# Patient Record
Sex: FEMALE | Race: BLACK OR AFRICAN AMERICAN | Employment: FULL TIME | URBAN - METROPOLITAN AREA
[De-identification: names, ages, dates, MRNs, and addresses within clinical notes are randomized per-mention and may not be internally consistent; named-entity substitution may affect disease eponyms.]

---

## 2017-10-04 ENCOUNTER — OP HISTORICAL/CONVERTED ENCOUNTER (OUTPATIENT)
Dept: OTHER | Age: 27
End: 2017-10-04

## 2018-03-30 ENCOUNTER — OP HISTORICAL/CONVERTED ENCOUNTER (OUTPATIENT)
Dept: OTHER | Age: 28
End: 2018-03-30

## 2019-08-25 ENCOUNTER — ED HISTORICAL/CONVERTED ENCOUNTER (OUTPATIENT)
Dept: OTHER | Age: 29
End: 2019-08-25

## 2019-08-26 ENCOUNTER — ED HISTORICAL/CONVERTED ENCOUNTER (OUTPATIENT)
Dept: OTHER | Age: 29
End: 2019-08-26

## 2021-12-29 ENCOUNTER — HOSPITAL ENCOUNTER (EMERGENCY)
Age: 31
Discharge: HOME | End: 2021-12-29
Attending: STUDENT IN AN ORGANIZED HEALTH CARE EDUCATION/TRAINING PROGRAM
Payer: COMMERCIAL

## 2021-12-29 ENCOUNTER — APPOINTMENT (OUTPATIENT)
Dept: ULTRASOUND IMAGING | Age: 31
End: 2021-12-29
Attending: STUDENT IN AN ORGANIZED HEALTH CARE EDUCATION/TRAINING PROGRAM
Payer: COMMERCIAL

## 2021-12-29 ENCOUNTER — APPOINTMENT (OUTPATIENT)
Dept: CT IMAGING | Age: 31
End: 2021-12-29
Attending: STUDENT IN AN ORGANIZED HEALTH CARE EDUCATION/TRAINING PROGRAM
Payer: COMMERCIAL

## 2021-12-29 VITALS
HEART RATE: 100 BPM | BODY MASS INDEX: 43.32 KG/M2 | WEIGHT: 260 LBS | TEMPERATURE: 99.6 F | RESPIRATION RATE: 17 BRPM | HEIGHT: 65 IN | OXYGEN SATURATION: 99 % | DIASTOLIC BLOOD PRESSURE: 80 MMHG | SYSTOLIC BLOOD PRESSURE: 130 MMHG

## 2021-12-29 DIAGNOSIS — K52.9 NONINFECTIOUS GASTROENTERITIS, UNSPECIFIED TYPE: Primary | ICD-10-CM

## 2021-12-29 LAB
ALBUMIN SERPL-MCNC: 3 G/DL (ref 3.5–5)
ALBUMIN/GLOB SERPL: 0.7 {RATIO} (ref 1.1–2.2)
ALP SERPL-CCNC: 57 U/L (ref 45–117)
ALT SERPL-CCNC: 22 U/L (ref 12–78)
ANION GAP SERPL CALC-SCNC: 10 MMOL/L (ref 5–15)
APPEARANCE UR: ABNORMAL
AST SERPL W P-5'-P-CCNC: 20 U/L (ref 15–37)
BACTERIA URNS QL MICRO: ABNORMAL /HPF
BASOPHILS # BLD: 0 K/UL (ref 0–0.1)
BASOPHILS NFR BLD: 0 % (ref 0–1)
BILIRUB SERPL-MCNC: 0.3 MG/DL (ref 0.2–1)
BILIRUB UR QL: NEGATIVE
BUN SERPL-MCNC: 7 MG/DL (ref 6–20)
BUN/CREAT SERPL: 12 (ref 12–20)
CA-I BLD-MCNC: 8.3 MG/DL (ref 8.5–10.1)
CHLORIDE SERPL-SCNC: 102 MMOL/L (ref 97–108)
CO2 SERPL-SCNC: 25 MMOL/L (ref 21–32)
COLOR UR: YELLOW
CREAT SERPL-MCNC: 0.6 MG/DL (ref 0.55–1.02)
DIFFERENTIAL METHOD BLD: ABNORMAL
EOSINOPHIL # BLD: 0 K/UL (ref 0–0.4)
EOSINOPHIL NFR BLD: 0 % (ref 0–7)
EPITH CASTS URNS QL MICRO: ABNORMAL /LPF
ERYTHROCYTE [DISTWIDTH] IN BLOOD BY AUTOMATED COUNT: 17.1 % (ref 11.5–14.5)
GLOBULIN SER CALC-MCNC: 4.5 G/DL (ref 2–4)
GLUCOSE SERPL-MCNC: 92 MG/DL (ref 65–100)
GLUCOSE UR STRIP.AUTO-MCNC: NEGATIVE MG/DL
HCT VFR BLD AUTO: 32.1 % (ref 35–47)
HGB BLD-MCNC: 9.7 G/DL (ref 11.5–16)
HGB UR QL STRIP: ABNORMAL
IMM GRANULOCYTES # BLD AUTO: 0 K/UL (ref 0–0.04)
IMM GRANULOCYTES NFR BLD AUTO: 0 % (ref 0–0.5)
KETONES UR QL STRIP.AUTO: 15 MG/DL
LEUKOCYTE ESTERASE UR QL STRIP.AUTO: ABNORMAL
LIPASE SERPL-CCNC: 52 U/L (ref 73–393)
LYMPHOCYTES # BLD: 1.1 K/UL (ref 0.8–3.5)
LYMPHOCYTES NFR BLD: 14 % (ref 12–49)
MCH RBC QN AUTO: 25.5 PG (ref 26–34)
MCHC RBC AUTO-ENTMCNC: 30.2 G/DL (ref 30–36.5)
MCV RBC AUTO: 84.3 FL (ref 80–99)
MONOCYTES # BLD: 0.6 K/UL (ref 0–1)
MONOCYTES NFR BLD: 8 % (ref 5–13)
MUCOUS THREADS URNS QL MICRO: ABNORMAL /LPF
NEUTS SEG # BLD: 6.3 K/UL (ref 1.8–8)
NEUTS SEG NFR BLD: 78 % (ref 32–75)
NITRITE UR QL STRIP.AUTO: NEGATIVE
PH UR STRIP: 6 [PH] (ref 5–8)
PLATELET # BLD AUTO: 392 K/UL (ref 150–400)
PMV BLD AUTO: 9.3 FL (ref 8.9–12.9)
POTASSIUM SERPL-SCNC: 2.9 MMOL/L (ref 3.5–5.1)
POTASSIUM SERPL-SCNC: 3.6 MMOL/L (ref 3.5–5.1)
PROT SERPL-MCNC: 7.5 G/DL (ref 6.4–8.2)
PROT UR STRIP-MCNC: 30 MG/DL
RBC # BLD AUTO: 3.81 M/UL (ref 3.8–5.2)
RBC #/AREA URNS HPF: ABNORMAL /HPF (ref 0–5)
SODIUM SERPL-SCNC: 137 MMOL/L (ref 136–145)
SP GR UR REFRACTOMETRY: 1.02 (ref 1–1.03)
UROBILINOGEN UR QL STRIP.AUTO: 0.1 EU/DL (ref 0.2–1)
WBC # BLD AUTO: 8 K/UL (ref 3.6–11)
WBC URNS QL MICRO: ABNORMAL /HPF (ref 0–4)

## 2021-12-29 PROCEDURE — 74011250637 HC RX REV CODE- 250/637: Performed by: STUDENT IN AN ORGANIZED HEALTH CARE EDUCATION/TRAINING PROGRAM

## 2021-12-29 PROCEDURE — 96375 TX/PRO/DX INJ NEW DRUG ADDON: CPT

## 2021-12-29 PROCEDURE — 74011250636 HC RX REV CODE- 250/636: Performed by: STUDENT IN AN ORGANIZED HEALTH CARE EDUCATION/TRAINING PROGRAM

## 2021-12-29 PROCEDURE — 80053 COMPREHEN METABOLIC PANEL: CPT

## 2021-12-29 PROCEDURE — 96361 HYDRATE IV INFUSION ADD-ON: CPT

## 2021-12-29 PROCEDURE — 76705 ECHO EXAM OF ABDOMEN: CPT

## 2021-12-29 PROCEDURE — 74176 CT ABD & PELVIS W/O CONTRAST: CPT

## 2021-12-29 PROCEDURE — 84132 ASSAY OF SERUM POTASSIUM: CPT

## 2021-12-29 PROCEDURE — 83690 ASSAY OF LIPASE: CPT

## 2021-12-29 PROCEDURE — 96365 THER/PROPH/DIAG IV INF INIT: CPT

## 2021-12-29 PROCEDURE — 81001 URINALYSIS AUTO W/SCOPE: CPT

## 2021-12-29 PROCEDURE — 85025 COMPLETE CBC W/AUTO DIFF WBC: CPT

## 2021-12-29 PROCEDURE — 99283 EMERGENCY DEPT VISIT LOW MDM: CPT

## 2021-12-29 RX ORDER — METHOTREXATE 2.5 MG/1
TABLET ORAL
COMMUNITY
Start: 2021-08-04

## 2021-12-29 RX ORDER — POTASSIUM CHLORIDE 1.5 G/1.77G
40 POWDER, FOR SOLUTION ORAL ONCE
Status: COMPLETED | OUTPATIENT
Start: 2021-12-29 | End: 2021-12-29

## 2021-12-29 RX ORDER — DIPHENHYDRAMINE HYDROCHLORIDE 50 MG/ML
50 INJECTION, SOLUTION INTRAMUSCULAR; INTRAVENOUS
Status: COMPLETED | OUTPATIENT
Start: 2021-12-29 | End: 2021-12-29

## 2021-12-29 RX ORDER — HYDROXYCHLOROQUINE SULFATE 200 MG/1
TABLET, FILM COATED ORAL
COMMUNITY
Start: 2021-08-04

## 2021-12-29 RX ORDER — SODIUM CHLORIDE 9 MG/ML
1000 INJECTION, SOLUTION INTRAVENOUS ONCE
Status: COMPLETED | OUTPATIENT
Start: 2021-12-29 | End: 2021-12-29

## 2021-12-29 RX ORDER — DICYCLOMINE HYDROCHLORIDE 10 MG/1
10 CAPSULE ORAL 4 TIMES DAILY
Qty: 20 CAPSULE | Refills: 0 | Status: SHIPPED | OUTPATIENT
Start: 2021-12-29

## 2021-12-29 RX ORDER — SERTRALINE HYDROCHLORIDE 100 MG/1
TABLET, FILM COATED ORAL
COMMUNITY

## 2021-12-29 RX ORDER — POTASSIUM CHLORIDE 7.45 MG/ML
10 INJECTION INTRAVENOUS ONCE
Status: COMPLETED | OUTPATIENT
Start: 2021-12-29 | End: 2021-12-29

## 2021-12-29 RX ORDER — CHOLECALCIFEROL (VITAMIN D3) 125 MCG
CAPSULE ORAL
COMMUNITY
Start: 2021-08-04

## 2021-12-29 RX ADMIN — DIPHENHYDRAMINE HYDROCHLORIDE 50 MG: 50 INJECTION INTRAMUSCULAR; INTRAVENOUS at 12:15

## 2021-12-29 RX ADMIN — POTASSIUM CHLORIDE 40 MEQ: 1.5 FOR SOLUTION ORAL at 12:31

## 2021-12-29 RX ADMIN — METHYLPREDNISOLONE SODIUM SUCCINATE 125 MG: 125 INJECTION, POWDER, FOR SOLUTION INTRAMUSCULAR; INTRAVENOUS at 12:17

## 2021-12-29 RX ADMIN — SODIUM CHLORIDE 1000 ML: 9 INJECTION, SOLUTION INTRAVENOUS at 10:36

## 2021-12-29 RX ADMIN — FAMOTIDINE 20 MG: 10 INJECTION INTRAVENOUS at 12:18

## 2021-12-29 RX ADMIN — POTASSIUM CHLORIDE 10 MEQ: 7.46 INJECTION, SOLUTION INTRAVENOUS at 12:22

## 2021-12-29 NOTE — LETTER
NOTIFICATION RETURN TO WORK / SCHOOL    12/29/2021 2:47 PM    [phone]      To Whom It May Concern:    Harinder Angel is currently under the care of [address] [place] [address]. She will return to work/school on: 01/01/2022  Harinder Angel may return to work/school with the following restrictions: None    If there are questions or concerns please have the patient contact our office.         Sincerely,      Radha Bower RN

## 2021-12-29 NOTE — ED PROVIDER NOTES
EMERGENCY DEPARTMENT HISTORY AND PHYSICAL EXAM      Date: 12/29/2021  Patient Name: Leatha Allen      History of Presenting Illness     Chief Complaint   Patient presents with    Headache    Abdominal Pain    Diarrhea       History Provided By: Patient    HPI: Leatha Allen, 33 y.o. female with PMH of autoimmune disease and pancreatitis who presents to the ED with abdominal pain. Abdominal pain is in the epigastrium and right upper quadrant, nothing specific makes it better or worse associated with diarrhea. Patient reports he has been having bowel movements for the last 3 days. She is also reporting fever. She called her primary care doctor earlier in the week and got a Covid test which was negative. Of note, patient is vaccinated. There are no other complaints, changes, or physical findings at this time. PCP: Cynthia Rodriguez MD    Current Outpatient Medications   Medication Sig Dispense Refill    cholecalciferol, vitamin D3, 50 mcg (2,000 unit) tab 1 tablet      methotrexate (RHEUMATREX) 2.5 mg tablet methotrexate sodium 2.5 mg tablet   6 tabs once a week      hydrOXYchloroQUINE (PLAQUENIL) 200 mg tablet hydroxychloroquine 200 mg tablet   take 1 tablet by mouth twice a day      dicyclomine (BENTYL) 10 mg capsule Take 1 Capsule by mouth four (4) times daily. 20 Capsule 0    sertraline (ZOLOFT) 100 mg tablet sertraline 100 mg tablet   Take 1 tablet every day by oral route. Past History     Past Medical History:  Past Medical History:   Diagnosis Date    Lupus ([facility] [address] [facility]     Pancreatitis     Sjogren's disease ([facility] [address] [facility]        Past Surgical History:  History reviewed. No pertinent surgical history. Family History:  History reviewed. No pertinent family history. Social History:  Social History     Tobacco Use    Smoking status: Never Smoker    Smokeless tobacco: Never Used   Substance Use Topics    Alcohol use: Never    Drug use: Never       Allergies:   Allergies   Allergen Reactions    Iodine Anaphylaxis    Nut. Therap. Glutaric Aciduria 1 Anaphylaxis         Review of Systems     Review of Systems   Constitutional: Negative for appetite change, chills and fever. HENT: Negative for rhinorrhea and trouble swallowing. Eyes: Negative for photophobia and visual disturbance. Respiratory: Negative for cough and shortness of breath. Cardiovascular: Negative for chest pain and leg swelling. Gastrointestinal: Positive for abdominal pain and diarrhea. Negative for constipation, nausea and vomiting. Genitourinary: Negative for dysuria and flank pain. Musculoskeletal: Negative for back pain and gait problem. Skin: Negative for color change and rash. Neurological: Negative for dizziness, weakness and headaches. Physical Exam     Physical Exam  Constitutional:       Appearance: Normal appearance. She is not ill-appearing. HENT:      Head: Normocephalic and atraumatic. Eyes:      Extraocular Movements: Extraocular movements intact. Conjunctiva/sclera: Conjunctivae normal.   Cardiovascular:      Rate and Rhythm: Normal rate and regular rhythm. Pulmonary:      Effort: Pulmonary effort is normal.      Breath sounds: Normal breath sounds. Abdominal:      General: Abdomen is flat. Palpations: Abdomen is soft. Tenderness: There is abdominal tenderness in the right upper quadrant and epigastric area. Musculoskeletal:         General: No tenderness or deformity. Cervical back: Normal range of motion and neck supple. Neurological:      General: No focal deficit present. Mental Status: She is alert and oriented to person, place, and time.          Lab and Diagnostic Study Results     Labs -     Recent Results (from the past 12 hour(s))   URINALYSIS W/ RFLX MICROSCOPIC    Collection Time: 12/29/21  9:00 AM   Result Value Ref Range    Color Yellow      Appearance Hazy (A) Clear      Specific gravity 1.020 1.003 - 1.030      pH (UA) 6.0 5.0 - 8.0      Protein 30 (A) Negative mg/dL    Glucose Negative Negative mg/dL    Ketone 15 (A) Negative mg/dL    Bilirubin Negative Negative      Blood Large (A) Negative      Urobilinogen 0.1 (L) 0.2 - 1.0 EU/dL    Nitrites Negative Negative      Leukocyte Esterase Trace (A) Negative     URINE MICROSCOPIC    Collection Time: 12/29/21  9:00 AM   Result Value Ref Range    WBC 0-4 0 - 4 /hpf    RBC 5-10 0 - 5 /hpf    Epithelial cells Many (A) Few /lpf    Bacteria 1+ (A) Negative /hpf    Mucus 4+ (A) Negative /lpf   CBC WITH AUTOMATED DIFF    Collection Time: 12/29/21 10:09 AM   Result Value Ref Range    WBC 8.0 3.6 - 11.0 K/uL    RBC 3.81 3.80 - 5.20 M/uL    HGB 9.7 (L) 11.5 - 16.0 g/dL    HCT 32.1 (L) 35.0 - 47.0 %    MCV 84.3 80.0 - 99.0 FL    MCH 25.5 (L) 26.0 - 34.0 PG    MCHC 30.2 30.0 - 36.5 g/dL    RDW 17.1 (H) 11.5 - 14.5 %    PLATELET 478 846 - 069 K/uL    MPV 9.3 8.9 - 12.9 FL    NEUTROPHILS 78 (H) 32 - 75 %    LYMPHOCYTES 14 12 - 49 %    MONOCYTES 8 5 - 13 %    EOSINOPHILS 0 0 - 7 %    BASOPHILS 0 0 - 1 %    IMMATURE GRANULOCYTES 0 0.0 - 0.5 %    ABS. NEUTROPHILS 6.3 1.8 - 8.0 K/UL    ABS. LYMPHOCYTES 1.1 0.8 - 3.5 K/UL    ABS. MONOCYTES 0.6 0.0 - 1.0 K/UL    ABS. EOSINOPHILS 0.0 0.0 - 0.4 K/UL    ABS. BASOPHILS 0.0 0.0 - 0.1 K/UL    ABS. IMM. GRANS. 0.0 0.00 - 0.04 K/UL    DF AUTOMATED     METABOLIC PANEL, COMPREHENSIVE    Collection Time: 12/29/21 10:09 AM   Result Value Ref Range    Sodium 137 136 - 145 mmol/L    Potassium 2.9 (L) 3.5 - 5.1 mmol/L    Chloride 102 97 - 108 mmol/L    CO2 25 21 - 32 mmol/L    Anion gap 10 5 - 15 mmol/L    Glucose 92 65 - 100 mg/dL    BUN 7 6 - 20 mg/dL    Creatinine 0.60 0.55 - 1.02 mg/dL    BUN/Creatinine ratio 12 12 - 20      GFR est AA >60 >60 ml/min/1.73m2    GFR est non-AA >60 >60 ml/min/1.73m2    Calcium 8.3 (L) 8.5 - 10.1 mg/dL    Bilirubin, total 0.3 0.2 - 1.0 mg/dL    AST (SGOT) 20 15 - 37 U/L    ALT (SGPT) 22 12 - 78 U/L    Alk.  phosphatase 57 45 - 117 U/L    Protein, total 7.5 6.4 - 8.2 g/dL    Albumin 3.0 (L) 3.5 - 5.0 g/dL    Globulin 4.5 (H) 2.0 - 4.0 g/dL    A-G Ratio 0.7 (L) 1.1 - 2.2     LIPASE    Collection Time: 12/29/21 10:09 AM   Result Value Ref Range    Lipase 52 (L) 73 - 393 U/L   POTASSIUM    Collection Time: 12/29/21  1:59 PM   Result Value Ref Range    Potassium 3.6 3.5 - 5.1 mmol/L       Radiologic Studies -   [unfilled]  CT Results  (Last 48 hours)               12/29/21 1134  CT ABD PELV WO CONT Final result    Impression:  Findings concerning for colitis with involvement of the terminal   ileum suspected. Other findings as above. Narrative:  CT abdomen and pelvis, 12/29/2021       History: Abdominal pain. Technique: No oral or intravenous contrast was administered. Multiple   contiguous axial images were obtained from the diaphragm to the inferior pubic   rami. Coronal and sagittal reconstruction of images was made. All CT scans at this facility are performed using dose reduction optimization   techniques as appropriate to perform the exam including the following: Automated   exposure control, adjustments of the mA and/or kV according to patient size, or   use iterative reconstruction technique. Comparison: Including CT abdomen and pelvis 8/26/2019. Findings: Image quality is degraded by patient's body habitus. The included lung bases show mild atelectatic changes. The liver, gallbladder, spleen, adrenal glands, pancreas demonstrate no focal   abnormality on this noncontrast enhanced study. The kidneys show no calculi or   hydronephrosis. No ventral wall hernia is identified. Gastric bypass is noted. Small hiatal hernia is present. The excluded stomach   is decompressed. There is no evidence of mechanical bowel obstruction. The   appendix images normally. The large bowel is decompressed.   There is relative bowel wall prominence in the   cecum, ascending and transverse colon with adjacent stranding concerning for   colitis. Involvement of the terminal ileum is also suspected. Crohn's disease   could be considered in the appropriate clinical setting. Reactive type   mesenteric lymph nodes are noted. There is no evidence of perforation or abscess formation. The bladder has no focal wall thickening or intraluminal calculus. 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 The uterus is suboptimally visualized on this study. No   gross adnexal abnormality is identified. Trace free fluid is seen in the   pelvis. Degenerative changes are present in the spine. CXR Results  (Last 48 hours)    None          Medical Decision Making and ED Course   - I am the first and primary provider for this patient AND AM THE PRIMARY PROVIDER OF RECORD. - I reviewed the vital signs, available nursing notes, past medical history, past surgical history, family history and social history. - Initial assessment performed. The patients presenting problems have been discussed, and the staff are in agreement with the care plan formulated and outlined with them. I have encouraged them to ask questions as they arise throughout their visit. Vital Signs-Reviewed the patient's vital signs. Patient Vitals for the past 24 hrs:   Temp Pulse Resp BP SpO2   12/29/21 1233  100 17 130/80 99 %   12/29/21 0815 99.6 °F (37.6 °C) (!) 112 18 135/81 100 %       Records Reviewed: Nursing Notes    Provider Notes (Medical Decision Making):     Patient with history of lupus and pancreatitis presenting with abdominal pain. Her exam is specific for tenderness in the epigastrium and right upper quadrant with questionably positive Jones sign. She is also been reporting diarrhea over the last several days. No vomiting and no other symptoms aside from what for mentioned. Thus, differential diagnosis includes pancreatitis, acute cholecystitis, and diverticulitis.  Other differential diagnoses were considered and are unlikely. Plan is to obtain CBC, chemistry, lipase, right upper quadrant ultrasound and if ultrasound is negative will obtain CT abdomen pelvis. ED Course:     Work-up in the ED is unremarkable for an acute finding. Patient does have colitis which is likely viral colitis. She appears clinically well and her tachycardia has resolved administration of intravenous fluid. Noted on her work-up that she had hypokalemia incidentally and that was repleted orally and intravenously with resolution of hyperkalemia. Thus discussed with patient discharge follow-up with her primary care doctor. I did sent a prescription of antispasmodic to her pharmacy to be taken and she should follow-up primary care doctor and come back if symptoms worsen, involvement, or if she develop new concerning symptoms. Disposition     Disposition: Condition stable  DC- Adult Discharges: All of the diagnostic tests were reviewed and questions answered. Diagnosis, care plan and treatment options were discussed. The patient understands the instructions and will follow up as directed. The patients results have been reviewed with them. They have been counseled regarding their diagnosis. The patient and spouse/SO verbally convey understanding and agreement of the signs, symptoms, diagnosis, treatment and prognosis and additionally agrees to follow up as recommended with their PCP in 24 - 48 hours. They also agree with the care-plan and convey that all of their questions have been answered. I have also put together some discharge instructions for them that include: 1) educational information regarding their diagnosis, 2) how to care for their diagnosis at home, as well a 3) list of reasons why they would want to return to the ED prior to their follow-up appointment, should their condition change. Discharged      DISCHARGE PLAN:  1.    Current Discharge Medication List      START taking these medications    Details   dicyclomine (BENTYL) 10 mg capsule Take 1 Capsule by mouth four (4) times daily. Qty: 20 Capsule, Refills: 0         CONTINUE these medications which have NOT CHANGED    Details   cholecalciferol, vitamin D3, 50 mcg (2,000 unit) tab 1 tablet      methotrexate (RHEUMATREX) 2.5 mg tablet methotrexate sodium 2.5 mg tablet   6 tabs once a week      hydrOXYchloroQUINE (PLAQUENIL) 200 mg tablet hydroxychloroquine 200 mg tablet   take 1 tablet by mouth twice a day      sertraline (ZOLOFT) 100 mg tablet sertraline 100 mg tablet   Take 1 tablet every day by oral route. 2.   Follow-up Information     Follow up With Specialties Details Why Contact Info    [facility] Emergency Medicine Go to  As needed, If symptoms worsen [address] [place] [id number] [phone]    Yunior MD Fernando Nurse Practitioner Schedule an appointment as soon as possible for a visit on 1/3/2022 For reevaluation, Discuss your visit to the ER [facility]  [phone]          3. Return to ED if worse   4. Current Discharge Medication List      START taking these medications    Details   dicyclomine (BENTYL) 10 mg capsule Take 1 Capsule by mouth four (4) times daily. Qty: 20 Capsule, Refills: 0  Start date: 12/29/2021             Diagnosis     Clinical Impression:   1. Noninfectious gastroenteritis, unspecified type        Attestations: Halie Mccarty MD    Please note that this dictation was completed with Re[employer]endo, the computer voice recognition software. Quite often unanticipated grammatical, syntax, homophones, and other interpretive errors are inadvertently transcribed by the computer software. Please disregard these errors. Please excuse any errors that have escaped final proofreading. Thank you.

## 2021-12-29 NOTE — ED TRIAGE NOTES
Upper abd pain x 3 days, when eating or drinking, no one else sick at home. Diarrhea x 3 days, urgent care in [facility].6, and to see hospital,  Intermittent sharp upper abd, hx of pancreatitis, [id number] last night, sweats. PHizer both shots vaccinated. Vomited today ,but has been keep able to drink and eat some.   Was pregnant 3 wks ago and but took morning after pill d/t lupus

## 2021-12-29 NOTE — DISCHARGE INSTRUCTIONS
Thank you! Thank you for allowing me to care for you in the emergency department. I sincerely hope that you are satisfied with your visit today. It is my goal to provide you with excellent care. Below you will find a list of your labs and imaging from your visit today. Should you have any questions regarding these results please do not hesitate to call the emergency department. Labs -     Recent Results (from the past 12 hour(s))   URINALYSIS W/ RFLX MICROSCOPIC    Collection Time: 12/29/21  9:00 AM   Result Value Ref Range    Color Yellow      Appearance Hazy (A) Clear      Specific gravity 1.020 1.003 - 1.030      pH (UA) 6.0 5.0 - 8.0      Protein 30 (A) Negative mg/dL    Glucose Negative Negative mg/dL    Ketone 15 (A) Negative mg/dL    Bilirubin Negative Negative      Blood Large (A) Negative      Urobilinogen 0.1 (L) 0.2 - 1.0 EU/dL    Nitrites Negative Negative      Leukocyte Esterase Trace (A) Negative     URINE MICROSCOPIC    Collection Time: 12/29/21  9:00 AM   Result Value Ref Range    WBC 0-4 0 - 4 /hpf    RBC 5-10 0 - 5 /hpf    Epithelial cells Many (A) Few /lpf    Bacteria 1+ (A) Negative /hpf    Mucus 4+ (A) Negative /lpf   CBC WITH AUTOMATED DIFF    Collection Time: 12/29/21 10:09 AM   Result Value Ref Range    WBC 8.0 3.6 - 11.0 K/uL    RBC 3.81 3.80 - 5.20 M/uL    HGB 9.7 (L) 11.5 - 16.0 g/dL    HCT 32.1 (L) 35.0 - 47.0 %    MCV 84.3 80.0 - 99.0 FL    MCH 25.5 (L) 26.0 - 34.0 PG    MCHC 30.2 30.0 - 36.5 g/dL    RDW 17.1 (H) 11.5 - 14.5 %    PLATELET 195 281 - 323 K/uL    MPV 9.3 8.9 - 12.9 FL    NEUTROPHILS 78 (H) 32 - 75 %    LYMPHOCYTES 14 12 - 49 %    MONOCYTES 8 5 - 13 %    EOSINOPHILS 0 0 - 7 %    BASOPHILS 0 0 - 1 %    IMMATURE GRANULOCYTES 0 0.0 - 0.5 %    ABS. NEUTROPHILS 6.3 1.8 - 8.0 K/UL    ABS. LYMPHOCYTES 1.1 0.8 - 3.5 K/UL    ABS. MONOCYTES 0.6 0.0 - 1.0 K/UL    ABS. EOSINOPHILS 0.0 0.0 - 0.4 K/UL    ABS. BASOPHILS 0.0 0.0 - 0.1 K/UL    ABS. IMM.  GRANS. 0.0 0.00 - 0.04 K/UL DF AUTOMATED     METABOLIC PANEL, COMPREHENSIVE    Collection Time: 12/29/21 10:09 AM   Result Value Ref Range    Sodium 137 136 - 145 mmol/L    Potassium 2.9 (L) 3.5 - 5.1 mmol/L    Chloride 102 97 - 108 mmol/L    CO2 25 21 - 32 mmol/L    Anion gap 10 5 - 15 mmol/L    Glucose 92 65 - 100 mg/dL    BUN 7 6 - 20 mg/dL    Creatinine 0.60 0.55 - 1.02 mg/dL    BUN/Creatinine ratio 12 12 - 20      GFR est AA >60 >60 ml/min/1.73m2    GFR est non-AA >60 >60 ml/min/1.73m2    Calcium 8.3 (L) 8.5 - 10.1 mg/dL    Bilirubin, total 0.3 0.2 - 1.0 mg/dL    AST (SGOT) 20 15 - 37 U/L    ALT (SGPT) 22 12 - 78 U/L    Alk. phosphatase 57 45 - 117 U/L    Protein, total 7.5 6.4 - 8.2 g/dL    Albumin 3.0 (L) 3.5 - 5.0 g/dL    Globulin 4.5 (H) 2.0 - 4.0 g/dL    A-G Ratio 0.7 (L) 1.1 - 2.2     LIPASE    Collection Time: 12/29/21 10:09 AM   Result Value Ref Range    Lipase 52 (L) 73 - 393 U/L   POTASSIUM    Collection Time: 12/29/21  1:59 PM   Result Value Ref Range    Potassium 3.6 3.5 - 5.1 mmol/L       Radiologic Studies -   CT ABD PELV WO CONT   Final Result   Findings concerning for colitis with involvement of the terminal   ileum suspected. Other findings as above. US ABD LTD   Final Result   No ultrasound evidence for infectious/inflammatory process involving   the gallbladder. Cause for epigastric pain undetermined. CT Results  (Last 48 hours)                 12/29/21 1134  CT ABD PELV WO CONT Final result    Impression:  Findings concerning for colitis with involvement of the terminal   ileum suspected. Other findings as above. Narrative:  CT abdomen and pelvis, 12/29/2021       History: Abdominal pain. Technique: No oral or intravenous contrast was administered. Multiple   contiguous axial images were obtained from the diaphragm to the inferior pubic   rami. Coronal and sagittal reconstruction of images was made.        All CT scans at this facility are performed using dose reduction optimization techniques as appropriate to perform the exam including the following: Automated   exposure control, adjustments of the mA and/or kV according to patient size, or   use iterative reconstruction technique. Comparison: Including CT abdomen and pelvis 8/26/2019. Findings: Image quality is degraded by patient's body habitus. The included lung bases show mild atelectatic changes. The liver, gallbladder, spleen, adrenal glands, pancreas demonstrate no focal   abnormality on this noncontrast enhanced study. The kidneys show no calculi or   hydronephrosis. No ventral wall hernia is identified. Gastric bypass is noted. Small hiatal hernia is present. The excluded stomach   is decompressed. There is no evidence of mechanical bowel obstruction. The   appendix images normally. The large bowel is decompressed. There is relative bowel wall prominence in the   cecum, ascending and transverse colon with adjacent stranding concerning for   colitis. Involvement of the terminal ileum is also suspected. Crohn's disease   could be considered in the appropriate clinical setting. Reactive type   mesenteric lymph nodes are noted. There is no evidence of perforation or abscess formation. The bladder has no focal wall thickening or intraluminal calculus. 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 The uterus is suboptimally visualized on this study. No   gross adnexal abnormality is identified. Trace free fluid is seen in the   pelvis. Degenerative changes are present in the spine. CXR Results  (Last 48 hours)      None               If you feel that you have not received excellent quality care or timely care, please ask to speak to the nurse manager. Please choose us in the future for your continued health care needs. ------------------------------------------------------------------------------------------------------------  The exam and treatment you received in the Emergency Department were for an urgent problem and are not intended as complete care. It is important that you follow-up with a doctor, nurse practitioner, or physician assistant to:  (1) confirm your diagnosis,  (2) re-evaluation of changes in your illness and treatment, and  (3) for ongoing care. If your symptoms become worse or you do not improve as expected and you are unable to reach your usual health care provider, you should return to the Emergency Department. We are available 24 hours a day. Please take your discharge instructions with you when you go to your follow-up appointment. If you have any problem arranging a follow-up appointment, contact the Emergency Department immediately. If a prescription has been provided, please have it filled as soon as possible to prevent a delay in treatment. Read the entire medication instruction sheet provided to you by the pharmacy. If you have any questions or reservations about taking the medication due to side effects or interactions with other medications, please call your primary care physician or contact the ER to speak with the charge nurse. Make an appointment with your family doctor or the physician you were referred to for follow-up of this visit as instructed on your discharge paperwork, as this is a mandatory follow-up. Return to the ER if you are unable to be seen or if you are unable to be seen in a timely manner. If you have any problem arranging the follow-up visit, contact the Emergency Department immediately.

## 2021-12-29 NOTE — Clinical Note
[address] [facility] EMERGENCY DEPARTMENT  [address] [id number] [phone]    Work/School Note    Date: 12/29/2021    To Whom It May concern:    Junior Terrell was seen and treated today in the emergency room by the following provider(s):  Attending Provider: Keshav Sweeney MD.      Junior Terrell is excused from work/school on 12/29/21 and 12/30/21. She is medically clear to return to work/school on 12/31/2021.        Sincerely,          Maureen White MD

## 2023-05-26 RX ORDER — HYDROXYCHLOROQUINE SULFATE 200 MG/1
TABLET, FILM COATED ORAL
COMMUNITY
Start: 2021-08-04

## 2023-05-26 RX ORDER — DICYCLOMINE HYDROCHLORIDE 10 MG/1
10 CAPSULE ORAL 4 TIMES DAILY
COMMUNITY
Start: 2021-12-29

## 2023-05-26 RX ORDER — SERTRALINE HYDROCHLORIDE 100 MG/1
TABLET, FILM COATED ORAL
COMMUNITY